# Patient Record
Sex: MALE | Race: OTHER | NOT HISPANIC OR LATINO | URBAN - METROPOLITAN AREA
[De-identification: names, ages, dates, MRNs, and addresses within clinical notes are randomized per-mention and may not be internally consistent; named-entity substitution may affect disease eponyms.]

---

## 2024-04-01 ENCOUNTER — EMERGENCY (EMERGENCY)
Facility: HOSPITAL | Age: 64
LOS: 1 days | Discharge: HOME | End: 2024-04-01
Attending: STUDENT IN AN ORGANIZED HEALTH CARE EDUCATION/TRAINING PROGRAM
Payer: SELF-PAY

## 2024-04-01 VITALS
DIASTOLIC BLOOD PRESSURE: 67 MMHG | SYSTOLIC BLOOD PRESSURE: 108 MMHG | HEIGHT: 69 IN | RESPIRATION RATE: 18 BRPM | HEART RATE: 84 BPM | OXYGEN SATURATION: 94 % | WEIGHT: 199.96 LBS | TEMPERATURE: 97 F

## 2024-04-01 PROCEDURE — 99285 EMERGENCY DEPT VISIT HI MDM: CPT

## 2024-04-01 NOTE — ED PROVIDER NOTE - CLINICAL SUMMARY MEDICAL DECISION MAKING FREE TEXT BOX
Floridalma: 64-year-old male with past medical history alcohol abuse presents with alcohol intoxication.  When asked why he is in the emergency department, patient states "I am drunk."  When asked how much he drank today, patient unable to elaborate.  Denies any recent injury or trauma.  Denies any pain anywhere.  Unable to obtain further history secondary to intoxication. Physical exam per above. No signs of trauma. Will obtain fingerstick glucose r/o hypoglycemia, observe, reassess when clinically sober.

## 2024-04-01 NOTE — ED PROVIDER NOTE - PROGRESS NOTE DETAILS
Mukund-: pt clinically sober, tolerating PO intake, will DC. Pending transport. Patient signed out to me by Dr. Espinosa pending transport home.  Initial transportation had to be canceled as there was no one at home to let the patient in and he did not have a key.  Social work was consulted and was able to reach the patient's son reported he would be home to receive the patient.  Ambulance service arrived but patient refused to walk although he walks with a walker at home and then refused to be taken home in a wheelchair.  Patient with 5 out of 5 strength in lower extremities on reexamination.  Patient unwilling to participate in transfers.  Social work reconsulted and will order an ambulance for patient.

## 2024-04-01 NOTE — ED PROVIDER NOTE - OBJECTIVE STATEMENT
64-year-old male with past medical history alcohol abuse presents with alcohol intoxication.  When asked why he is in the emergency department, patient states "I am drunk."  When asked how much he drank today, patient unable to elaborate.  Denies any recent injury or trauma.  Denies any pain anywhere.  Unable to obtain further history secondary to intoxication.

## 2024-04-01 NOTE — ED ADULT NURSE NOTE - NSFALLHARMRISKINTERV_ED_ALL_ED

## 2024-04-01 NOTE — ED PROVIDER NOTE - PATIENT PORTAL LINK FT
You can access the FollowMyHealth Patient Portal offered by [facility] by registering at the following website: [email or web address]. By joining [employer]’s FollowMyHealth portal, you will also be able to view your health information using other applications (apps) compatible with our system.

## 2024-04-01 NOTE — ED PROVIDER NOTE - PHYSICAL EXAMINATION
CONSTITUTIONAL: non-toxic, inebriated appearing  SKIN: no rash, no petechiae.  EYES: PERRL, EOMI, pink conjunctiva, anicteric  HEENT: NC/AT  NECK: Supple; no meningismus, no JVD  CARD: RRR, no murmurs, equal radial pulses bilaterally 2+  RESP: CTAB, no respiratory distress  ABD: Soft, non-tender, non-distended, no peritoneal signs  EXT: Normal ROM x4. No edema.   NEURO: Alert, moving all extremities.

## 2024-04-02 VITALS
HEART RATE: 93 BPM | SYSTOLIC BLOOD PRESSURE: 133 MMHG | DIASTOLIC BLOOD PRESSURE: 77 MMHG | RESPIRATION RATE: 20 BRPM | OXYGEN SATURATION: 96 %

## 2024-04-02 LAB
ALBUMIN SERPL ELPH-MCNC: 3.6 G/DL — SIGNIFICANT CHANGE UP (ref 3.5–5)
ALP SERPL-CCNC: 62 U/L — SIGNIFICANT CHANGE UP (ref 40–120)
ALT FLD-CCNC: 53 U/L DA — SIGNIFICANT CHANGE UP (ref 10–60)
ANION GAP SERPL CALC-SCNC: 15 MMOL/L — SIGNIFICANT CHANGE UP (ref 5–17)
AST SERPL-CCNC: 102 U/L — HIGH (ref 10–40)
BASOPHILS # BLD AUTO: 0.03 K/UL — SIGNIFICANT CHANGE UP (ref 0–0.2)
BASOPHILS NFR BLD AUTO: 0.4 % — SIGNIFICANT CHANGE UP (ref 0–2)
BILIRUB SERPL-MCNC: 0.7 MG/DL — SIGNIFICANT CHANGE UP (ref 0.2–1.2)
BUN SERPL-MCNC: 21 MG/DL — HIGH (ref 7–18)
CALCIUM SERPL-MCNC: 9.2 MG/DL — SIGNIFICANT CHANGE UP (ref 8.4–10.5)
CHLORIDE SERPL-SCNC: 101 MMOL/L — SIGNIFICANT CHANGE UP (ref 96–108)
CO2 SERPL-SCNC: 20 MMOL/L — LOW (ref 22–31)
CREAT SERPL-MCNC: 1.16 MG/DL — SIGNIFICANT CHANGE UP (ref 0.5–1.3)
EGFR: 67 ML/MIN/1.73M2 — SIGNIFICANT CHANGE UP
EOSINOPHIL # BLD AUTO: 0.01 K/UL — SIGNIFICANT CHANGE UP (ref 0–0.5)
EOSINOPHIL NFR BLD AUTO: 0.1 % — SIGNIFICANT CHANGE UP (ref 0–6)
GLUCOSE SERPL-MCNC: 178 MG/DL — HIGH (ref 70–99)
HCT VFR BLD CALC: 37.6 % — LOW (ref 39–50)
HGB BLD-MCNC: 12.9 G/DL — LOW (ref 13–17)
IMM GRANULOCYTES NFR BLD AUTO: 0.7 % — SIGNIFICANT CHANGE UP (ref 0–0.9)
LYMPHOCYTES # BLD AUTO: 1.36 K/UL — SIGNIFICANT CHANGE UP (ref 1–3.3)
LYMPHOCYTES # BLD AUTO: 19 % — SIGNIFICANT CHANGE UP (ref 13–44)
MAGNESIUM SERPL-MCNC: 1.1 MG/DL — LOW (ref 1.6–2.6)
MCHC RBC-ENTMCNC: 32.8 PG — SIGNIFICANT CHANGE UP (ref 27–34)
MCHC RBC-ENTMCNC: 34.3 GM/DL — SIGNIFICANT CHANGE UP (ref 32–36)
MCV RBC AUTO: 95.7 FL — SIGNIFICANT CHANGE UP (ref 80–100)
MONOCYTES # BLD AUTO: 0.42 K/UL — SIGNIFICANT CHANGE UP (ref 0–0.9)
MONOCYTES NFR BLD AUTO: 5.9 % — SIGNIFICANT CHANGE UP (ref 2–14)
NEUTROPHILS # BLD AUTO: 5.28 K/UL — SIGNIFICANT CHANGE UP (ref 1.8–7.4)
NEUTROPHILS NFR BLD AUTO: 73.9 % — SIGNIFICANT CHANGE UP (ref 43–77)
NRBC # BLD: 0 /100 WBCS — SIGNIFICANT CHANGE UP (ref 0–0)
PLATELET # BLD AUTO: 114 K/UL — LOW (ref 150–400)
POTASSIUM SERPL-MCNC: 3.7 MMOL/L — SIGNIFICANT CHANGE UP (ref 3.5–5.3)
POTASSIUM SERPL-SCNC: 3.7 MMOL/L — SIGNIFICANT CHANGE UP (ref 3.5–5.3)
PROT SERPL-MCNC: 7.3 G/DL — SIGNIFICANT CHANGE UP (ref 6–8.3)
RBC # BLD: 3.93 M/UL — LOW (ref 4.2–5.8)
RBC # FLD: 12.9 % — SIGNIFICANT CHANGE UP (ref 10.3–14.5)
SODIUM SERPL-SCNC: 136 MMOL/L — SIGNIFICANT CHANGE UP (ref 135–145)
WBC # BLD: 7.15 K/UL — SIGNIFICANT CHANGE UP (ref 3.8–10.5)
WBC # FLD AUTO: 7.15 K/UL — SIGNIFICANT CHANGE UP (ref 3.8–10.5)

## 2024-04-02 PROCEDURE — 36415 COLL VENOUS BLD VENIPUNCTURE: CPT

## 2024-04-02 PROCEDURE — 80053 COMPREHEN METABOLIC PANEL: CPT

## 2024-04-02 PROCEDURE — 99285 EMERGENCY DEPT VISIT HI MDM: CPT | Mod: 25

## 2024-04-02 PROCEDURE — 96375 TX/PRO/DX INJ NEW DRUG ADDON: CPT

## 2024-04-02 PROCEDURE — 83735 ASSAY OF MAGNESIUM: CPT

## 2024-04-02 PROCEDURE — 93005 ELECTROCARDIOGRAM TRACING: CPT

## 2024-04-02 PROCEDURE — 85025 COMPLETE CBC W/AUTO DIFF WBC: CPT

## 2024-04-02 PROCEDURE — 82962 GLUCOSE BLOOD TEST: CPT

## 2024-04-02 PROCEDURE — 96374 THER/PROPH/DIAG INJ IV PUSH: CPT

## 2024-04-02 RX ORDER — FOLIC ACID 0.8 MG
1 TABLET ORAL ONCE
Refills: 0 | Status: COMPLETED | OUTPATIENT
Start: 2024-04-02 | End: 2024-04-02

## 2024-04-02 RX ORDER — SODIUM CHLORIDE 9 MG/ML
1000 INJECTION INTRAMUSCULAR; INTRAVENOUS; SUBCUTANEOUS ONCE
Refills: 0 | Status: COMPLETED | OUTPATIENT
Start: 2024-04-02 | End: 2024-04-02

## 2024-04-02 RX ORDER — THIAMINE MONONITRATE (VIT B1) 100 MG
100 TABLET ORAL ONCE
Refills: 0 | Status: COMPLETED | OUTPATIENT
Start: 2024-04-02 | End: 2024-04-02

## 2024-04-02 RX ADMIN — Medication 1 MILLIGRAM(S): at 22:23

## 2024-04-02 RX ADMIN — SODIUM CHLORIDE 1000 MILLILITER(S): 9 INJECTION INTRAMUSCULAR; INTRAVENOUS; SUBCUTANEOUS at 21:54

## 2024-04-02 RX ADMIN — Medication 50 MILLIGRAM(S): at 22:23

## 2024-04-02 RX ADMIN — Medication 100 MILLIGRAM(S): at 21:54

## 2024-04-02 RX ADMIN — Medication 2 MILLIGRAM(S): at 21:54

## 2024-04-02 NOTE — ED ADULT NURSE REASSESSMENT NOTE - NS ED NURSE REASSESS COMMENT FT1
Patient alert and verbally responsive, not in apparent distress, meal tray given Patient tolerated well. SW called and spoke with Patient son, why is waiting for him at home  Patient will D/C awaiting for ambulate. Patient alert and verbally responsive, not in apparent distress, meal tray given Patient tolerated well. SW called and spoke with Patient son,  is waiting for him at home  Patient will D/C awaiting for ambulate.

## 2024-04-02 NOTE — ED ADULT NURSE REASSESSMENT NOTE - NS ED NURSE REASSESS COMMENT FT1
patient AxOx2 stated "I have no key to get into house." Primary RN called Pt's son, Tolu, via phone number [phone], no response. Pt informed. Pt unsafe for discharge.  Sahra was called and notified, nurse manager Tatiana Nogeura made aware.

## 2024-04-02 NOTE — CHART NOTE - NSCHARTNOTEFT_GEN_A_CORE
Sharathraman received a call re Pt's d/c update.     Pt was seen by the day srinivasan re same consult and I really appreciate her progress note.     Assigned nurse, L:gómez shared she outreached Pt 's home regarding if there would be a family member to receive Pt at home but there was no response from the phone number. She further reported Pt has no key to his apartment located [address], [place],.     At 5 25pm,Srinivasan outreached Pt's family at [phone]. Call was picked up by Pt's wife Prema. Srinivasan introduced self, purpose of call and supportive role explained  Srinivasan informed Prema of Pt d/c status and transportation was arranged to bring Pt home. Prema shared she would be home to receive Pt and  a walker had already been left at the lower level of the building.  Emotional support provided. Blanca, assigned nurse updated.     At 7.20pm srinivasan followed up with Pt's family re Pt coming home via ambulette in an hour the  call was made. Spoke to Ruben, one of Pt's sons who confirmed he and the rest of the family members would be home to receive Pt. Asst Sabrina. Nurse Manager notified.    At 8>56pm Pt continued to decline sitting in the wheel chait despie all refuse      At 8; 56pm. srinivasan was updated by Sabrina that  Pt declined sitting in the wheel chair when the ambulette came to pick him up, he continued to decline sitting in the wheel chair despite all the efforts made to get him to sit in the wheel chair. Per Sabrina, an ambulance would be arranged to transport Pt home.     at 10:45 srinivasan received a call from the ED unit [facility] reporting an ambulance had come to  the Pt. Srinivasan outreached pt's home to confirm if someone would be home to receive Pt. Shane, Pt's son picked up the call and reported they were all home to receive Pt.     leeanna Riggins updated. . Sharathraman received a call re Pt's d/c update.     Pt was seen by the day srinivasan earlier re same consult and really appreciate her note.    Assigned nurse, Blanca shared she outreached Pt 's home to inquire if there would be a family member to receive Pt at home but there was no response. She further reported Pt has no key to his apartment located [address], [place],.     At 5 25pm,Srinivasan outreached Pt's family at [phone]. Call was picked up by Pt's wife Prema. Srinivasan introduced self, purpose of call and supportive role explained  Srinivasan informed Prema of Pt d/c status and transportation was arranged to bring Pt home. Prema shared she would be home to receive Pt and a walker had already been left at the lower level of the building.  Emotional support provided. Blanca,, assigned nurse updated.     At 7.20pm srinivasan followed up with Pt's family re Pt coming home via ambulette in an hour the  call was made. Spoke to Ruben, one of Pt's sons who confirmed he and the rest of the family members would be home to receive Pt. Asst Sabrina. Nurse Manager notified.    At 8; 56pm. srinivasan was updated by Sabrina that  Pt declined sitting in the wheel chair when the ambulette came to pick him up, he continued to decline sitting in the wheel chair despite all the efforts made to get him to sit .. Per Sabrina, an ambulance with a stretcher would be arranged to transport Pt home.     At 10:45 srinivasan received a call from the ED unit [facility] reporting an ambulance had come to  the Pt. Srinivasan outreached Pt's home to confirm if someone would be home to receive Pt. Shane, Pt's son picked up the call and reported they were all home to receive Pt.     [facility] unit cleshalini updated. .

## 2024-04-02 NOTE — ED ADULT NURSE REASSESSMENT NOTE - NS ED NURSE REASSESS COMMENT FT1
[facility] on scene to  pt. Pt unable to be transported via wheel chair.  Transportation service notified company to send BLS for transport.

## 2024-04-02 NOTE — CHART NOTE - NSCHARTNOTEFT_GEN_A_CORE
SW rotation received for "disposition". Pt is a 70 year old male who came to the ED yesterday due to alcohol intoxication. Pt was dc home last night, but was unable to get into his apartment due to having no keys. EMS brought pt back to the ED. SW met with pt at bedside, introduced herself and explained her role. Pt is AOx3, engaging and receptive of BRANDO.    Pt reports he resides in an apartment with his wife Prema and 2 sons Shane and Ruben located at [address], [address] [place] [address]. Pt confirmed EMS brought him home last night but no one answered the door. Pt stated he does not have any personal belongings on him; keys, phone, wallet. SW inquired if pt knows a phone number for a family member. Pt provided SW with his home number [phone]. SW outreached the number multiples times and finally got in touch with pt's son Ruben who confirmed he and his mom are home to receive the pt. Ruben requested SW arrange ambulette transportation for pt to return home. Ruben requested when pt arrives, EMS rings buzzer and family will bring down walker.    BRANDO arranged ambulette transportation for 2 PM and informed pt and medical team.